# Patient Record
Sex: FEMALE | Race: WHITE | Employment: FULL TIME | URBAN - METROPOLITAN AREA
[De-identification: names, ages, dates, MRNs, and addresses within clinical notes are randomized per-mention and may not be internally consistent; named-entity substitution may affect disease eponyms.]

---

## 2021-07-03 ENCOUNTER — HOSPITAL ENCOUNTER (EMERGENCY)
Facility: CLINIC | Age: 36
Discharge: HOME | End: 2021-07-03
Attending: FAMILY MEDICINE | Admitting: FAMILY MEDICINE
Payer: COMMERCIAL

## 2021-07-03 VITALS
DIASTOLIC BLOOD PRESSURE: 71 MMHG | TEMPERATURE: 99 F | HEART RATE: 86 BPM | RESPIRATION RATE: 19 BRPM | OXYGEN SATURATION: 98 % | SYSTOLIC BLOOD PRESSURE: 120 MMHG

## 2021-07-03 DIAGNOSIS — R10.84 ABDOMINAL PAIN, GENERALIZED: ICD-10-CM

## 2021-07-03 PROCEDURE — 99282 EMERGENCY DEPT VISIT SF MDM: CPT | Performed by: FAMILY MEDICINE

## 2021-07-03 NOTE — ED TRIAGE NOTES
Pt here with sharp right sided abd pain, pt had intrauterine fetal surgery on Tues, pt screaming in pain. Pt  Is 24 weeks pregnant. Procedure was done at [facility]. Pt brought back to room 4

## 2021-07-04 NOTE — ED PROVIDER NOTES
History     Chief Complaint   Patient presents with     Post-op Problem     HPI  Ana Hinds is a 35 year old female at 25 weeks 5 days gestation who presents with abdominal pain.  Four days ago she underwent fetal surgery with an open abdominal approach and fetoscopic approach for repair of a neural tube defect.  This was done at [facility] by Dr. Jamie Valera.  She was discharged yesterday.  Today she awoke with sharp pain in the right lower quadrant.  She took some naproxen and Tylenol and oxycodone for it.  A few hours later the pain improved.  Tonight at 5:30 PM she had a bowel movement that was normal.  She then developed sudden onset of severe pain in the right side and again took oxycodone and naproxen around 6 PM.  She has been sensing fetal movement.  She has not been having any vaginal fluid leakage or vaginal bleeding.  She has not been having fever or vomiting.  She has pain in the lower abdomen in the area of her incision but not its not severe.    Allergies:  No Known Allergies    Problem List:    There are no active problems to display for this patient.       Past Medical History:    No past medical history on file.    Past Surgical History:    No past surgical history on file.    Family History:    No family history on file.    Social History:  Marital Status:   [2]  Social History     Tobacco Use     Smoking status: Not on file   Substance Use Topics     Alcohol use: Not on file     Drug use: Not on file        Medications:    No current outpatient medications on file.        Review of Systems  All other systems are reviewed and are negative    Physical Exam   BP: (!) 147/83  Pulse: 86  Temp: 99  F (37.2  C)  Resp: 17  SpO2: 99 %      Physical Exam  Nursing note and vitals were reviewed.  Constitutional: Awake and alert, adequately nourished and developed appearing 35-year-old in no longer in discomfort, who does not appear acutely ill, and who answers questions  appropriately and cooperates with examination.  Pulmonary/Chest: Breathing is unlabored.   Abdomen: Avid, soft, tender over the incision in the lower abdomen which is clean, dry, intact.  Uterus is nonirritable, but I cannot determine if the uterus itself is tenderness with the overlying abdominal tenderness.  No other abdominal tenderness is present.  Bedside ultrasound shows normal fetal heart tones between 156 and 160 and an active moving fetus with robust amniotic fluid.  Neurological: Alert, oriented, thought content logical, coherent   Psychiatric: Affect broad and appropriate.      ED Course        Procedures               Critical Care time:  none               No results found for this or any previous visit (from the past 24 hour(s)).    Medications - No data to display    Assessments & Plan (with Medical Decision Making)     35-year-old female at 25 weeks 5 days gestation 4 days status post fetus scopic surgery for fetal anomaly presented with sudden onset of severe pain now mostly resolved.  I discussed her situation with her maternal-fetal medicine physician Dr. Valera.  He asked us to check fetal heart tones and amniotic fluid volume at the bedside which we did and which appeared grossly normal.  I reviewed this with him.  In the patient discussed her situation.  He and I recommended that the patient go to [facility] for further assessment.  However after discussion with him patient she wished to go home.  He was comfortable with this plan.  They believe that this may well of been gas pain related to the procedure and either insufflated gas or recovering bowel function.  I advised the patient that the best evaluation would be to go to [facility] to see her MTM specialist.  I advised her that she should certainly be seen if the pain recurs, fevers develop, loss of fetal movement, vaginal bleeding, vaginal fluid leakage, or other worrisome symptoms, she should be seen for reevaluation, ideally at [facility] where she has access to maternal-fetal medicine and other specialty care.  She expressed understanding and her questions were answered.    I have reviewed the nursing notes.    I have reviewed the findings, diagnosis, plan and need for follow up with the patient.       There are no discharge medications for this patient.      Final diagnoses:   Abdominal pain, generalized       7/3/2021   [facility] EMERGENCY DEPT     Burt Ayala MD  07/03/21 2915

## 2021-07-04 NOTE — DISCHARGE INSTRUCTIONS
Follow-up with maternal-fetal medicine as planned.  Be seen if you have persistent pain, vaginal fluid leakage, vaginal bleeding, fevers, vomiting, lack of fetal movement, or other worrisome symptoms.